# Patient Record
Sex: FEMALE | Race: WHITE
[De-identification: names, ages, dates, MRNs, and addresses within clinical notes are randomized per-mention and may not be internally consistent; named-entity substitution may affect disease eponyms.]

---

## 2018-02-13 ENCOUNTER — HOSPITAL ENCOUNTER (OUTPATIENT)
Dept: HOSPITAL 17 - PHSDC | Age: 68
Discharge: HOME | End: 2018-02-13
Payer: MEDICARE

## 2018-02-13 VITALS
RESPIRATION RATE: 16 BRPM | HEART RATE: 83 BPM | OXYGEN SATURATION: 97 % | DIASTOLIC BLOOD PRESSURE: 53 MMHG | SYSTOLIC BLOOD PRESSURE: 95 MMHG

## 2018-02-13 VITALS — WEIGHT: 143.3 LBS | HEIGHT: 61 IN | BODY MASS INDEX: 27.06 KG/M2

## 2018-02-13 VITALS — TEMPERATURE: 97.7 F

## 2018-02-13 DIAGNOSIS — H02.34: ICD-10-CM

## 2018-02-13 DIAGNOSIS — I10: ICD-10-CM

## 2018-02-13 DIAGNOSIS — H02.31: Primary | ICD-10-CM

## 2018-02-13 PROCEDURE — 00103 ANES RCNSTV PX EYELID: CPT

## 2018-02-13 PROCEDURE — 67904 REPAIR EYELID DEFECT: CPT

## 2018-02-13 NOTE — PD.OP
__________________________________________________





Operative Report


Bilateral Blepharochalasis and mild levator ptosis, Right more than left


Postoperative Diagnosis:  


Bilateral Blepharochalasis and mild levator ptosis, Right more than left


Procedure:


Bilateral Levator plication and blepharoplasties


Anesthesia:


IV sedation and local


Surgeon:


Natalio Johnson


Assistant(s):


RN


Resident Surgeon:


none


Operation and Findings:


Patient's both upper eyelids were marked preoperatively in holding area in 

sitting position, brought to the OR


IV sedation was started and Lidocaine 1% with epi 4 cc, 0.5 cc epi 1:1000, and 

1 cc Sodium bicarbonate injected total


Prep and drape done


Time out completed


Patient had a brief period of high blood pressure spike - it was allowed to 

settle down and the anesthesiologist also


gave her some beta blocker.


Surgery was started on right side, Marked skin ellipse and underlying 

orbicularis were excised down to the orbital septum


Lower part of the eyelid was placed on suture traction lightly


Orbital septum was opened from medial to lateral , exposing the medial and the 

middle fat pads, The lacrimal gland was avoided.


Orbital fat pads were anesthetized first and carefully removed at the surface 

level without pulling; with low power coag cautery. 


No active bleeding encountered.


Tarsal plate upper border was dissected clean, 5/0 Prolene suture was placed 

between the tarsal plate edge and levator aponeurosis


just below the middle fat pad and gradually tightened while releasing the lower 

traction suture. One additional suture was placed on 


either side of this suture. Patient was asked to open and close the eyelids to 

verify the improvement and range of the levator. 


Patient did not feel any corneal sensation from the sutures. Orbital septum was 

approximated with single 5/0 Vicryl and the 


lid skin was closed with 5/0 plain gut, keeping the medial end long, to be 

taped to the glabella at the end of surgery.


Similar procedure was completed on the left side as well. Both sides had 

excellent improvement and symmetry. Approx 2 mm elevation


was noted from the preoperative position.


Patient remained stable, minimal bleeding, no complications,











Natalio Johnson MD Feb 13, 2018 09:37

## 2018-04-03 ENCOUNTER — HOSPITAL ENCOUNTER (OUTPATIENT)
Dept: HOSPITAL 17 - NEPD | Age: 68
Setting detail: OBSERVATION
LOS: 1 days | Discharge: HOME | End: 2018-04-04
Attending: HOSPITALIST | Admitting: HOSPITALIST
Payer: MEDICARE

## 2018-04-03 VITALS
SYSTOLIC BLOOD PRESSURE: 112 MMHG | HEART RATE: 80 BPM | DIASTOLIC BLOOD PRESSURE: 57 MMHG | RESPIRATION RATE: 16 BRPM | OXYGEN SATURATION: 96 %

## 2018-04-03 VITALS
DIASTOLIC BLOOD PRESSURE: 57 MMHG | HEART RATE: 84 BPM | OXYGEN SATURATION: 97 % | TEMPERATURE: 98.3 F | RESPIRATION RATE: 17 BRPM | SYSTOLIC BLOOD PRESSURE: 114 MMHG

## 2018-04-03 VITALS
DIASTOLIC BLOOD PRESSURE: 68 MMHG | OXYGEN SATURATION: 96 % | TEMPERATURE: 98.2 F | HEART RATE: 74 BPM | RESPIRATION RATE: 18 BRPM | SYSTOLIC BLOOD PRESSURE: 114 MMHG

## 2018-04-03 VITALS — BODY MASS INDEX: 26.49 KG/M2 | HEIGHT: 61 IN | WEIGHT: 140.3 LBS

## 2018-04-03 DIAGNOSIS — M25.511: ICD-10-CM

## 2018-04-03 DIAGNOSIS — Z79.899: ICD-10-CM

## 2018-04-03 DIAGNOSIS — I73.00: ICD-10-CM

## 2018-04-03 DIAGNOSIS — J30.2: ICD-10-CM

## 2018-04-03 DIAGNOSIS — S52.021A: Primary | ICD-10-CM

## 2018-04-03 DIAGNOSIS — F41.9: ICD-10-CM

## 2018-04-03 DIAGNOSIS — E11.43: ICD-10-CM
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
## 2018-04-03 DIAGNOSIS — E05.90: ICD-10-CM

## 2018-04-03 DIAGNOSIS — E78.5: ICD-10-CM

## 2018-04-03 DIAGNOSIS — S80.01XA: ICD-10-CM

## 2018-04-03 DIAGNOSIS — F17.290: ICD-10-CM

## 2018-04-03 DIAGNOSIS — M25.521: ICD-10-CM
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
## 2018-04-03 DIAGNOSIS — Z79.84: ICD-10-CM

## 2018-04-03 DIAGNOSIS — E03.9: ICD-10-CM

## 2018-04-03 DIAGNOSIS — I10: ICD-10-CM

## 2018-04-03 DIAGNOSIS — G25.81: ICD-10-CM

## 2018-04-03 DIAGNOSIS — M79.7: ICD-10-CM

## 2018-04-03 DIAGNOSIS — K21.9: ICD-10-CM

## 2018-04-03 DIAGNOSIS — K58.9: ICD-10-CM

## 2018-04-03 DIAGNOSIS — F32.9: ICD-10-CM

## 2018-04-03 LAB
ALBUMIN SERPL-MCNC: 3.7 GM/DL (ref 3.4–5)
ALP SERPL-CCNC: 88 U/L (ref 45–117)
ALT SERPL-CCNC: 35 U/L (ref 10–53)
AST SERPL-CCNC: 70 U/L (ref 15–37)
BASOPHILS # BLD AUTO: 0.1 TH/MM3 (ref 0–0.2)
BASOPHILS NFR BLD: 0.6 % (ref 0–2)
BILIRUB SERPL-MCNC: 0.3 MG/DL (ref 0.2–1)
BUN SERPL-MCNC: 16 MG/DL (ref 7–18)
CALCIUM SERPL-MCNC: 9 MG/DL (ref 8.5–10.1)
CHLORIDE SERPL-SCNC: 106 MEQ/L (ref 98–107)
CREAT SERPL-MCNC: 0.83 MG/DL (ref 0.5–1)
EOSINOPHIL # BLD: 0.1 TH/MM3 (ref 0–0.4)
EOSINOPHIL NFR BLD: 0.5 % (ref 0–4)
ERYTHROCYTE [DISTWIDTH] IN BLOOD BY AUTOMATED COUNT: 13.4 % (ref 11.6–17.2)
GFR SERPLBLD BASED ON 1.73 SQ M-ARVRAT: 69 ML/MIN (ref 89–?)
GLUCOSE SERPL-MCNC: 109 MG/DL (ref 74–106)
HCO3 BLD-SCNC: 26.9 MEQ/L (ref 21–32)
HCT VFR BLD CALC: 38.1 % (ref 35–46)
HGB BLD-MCNC: 13.1 GM/DL (ref 11.6–15.3)
INR PPP: 1.2 RATIO
LYMPHOCYTES # BLD AUTO: 1.2 TH/MM3 (ref 1–4.8)
LYMPHOCYTES NFR BLD AUTO: 12.4 % (ref 9–44)
MCH RBC QN AUTO: 32.6 PG (ref 27–34)
MCHC RBC AUTO-ENTMCNC: 34.5 % (ref 32–36)
MCV RBC AUTO: 94.5 FL (ref 80–100)
MONOCYTE #: 0.5 TH/MM3 (ref 0–0.9)
MONOCYTES NFR BLD: 4.7 % (ref 0–8)
NEUTROPHILS # BLD AUTO: 8.2 TH/MM3 (ref 1.8–7.7)
NEUTROPHILS NFR BLD AUTO: 81.8 % (ref 16–70)
PLATELET # BLD: 236 TH/MM3 (ref 150–450)
PMV BLD AUTO: 9.5 FL (ref 7–11)
PROT SERPL-MCNC: 6.8 GM/DL (ref 6.4–8.2)
PROTHROMBIN TIME: 12.5 SEC (ref 9.8–11.6)
RBC # BLD AUTO: 4.03 MIL/MM3 (ref 4–5.3)
SODIUM SERPL-SCNC: 139 MEQ/L (ref 136–145)
WBC # BLD AUTO: 10 TH/MM3 (ref 4–11)

## 2018-04-03 PROCEDURE — 73060 X-RAY EXAM OF HUMERUS: CPT

## 2018-04-03 PROCEDURE — 85610 PROTHROMBIN TIME: CPT

## 2018-04-03 PROCEDURE — 71045 X-RAY EXAM CHEST 1 VIEW: CPT

## 2018-04-03 PROCEDURE — 73564 X-RAY EXAM KNEE 4 OR MORE: CPT

## 2018-04-03 PROCEDURE — 24685 OPTX ULNAR FX PROX END W/FIX: CPT

## 2018-04-03 PROCEDURE — 80048 BASIC METABOLIC PNL TOTAL CA: CPT

## 2018-04-03 PROCEDURE — 73080 X-RAY EXAM OF ELBOW: CPT

## 2018-04-03 PROCEDURE — 73030 X-RAY EXAM OF SHOULDER: CPT

## 2018-04-03 PROCEDURE — 93005 ELECTROCARDIOGRAM TRACING: CPT

## 2018-04-03 PROCEDURE — 96376 TX/PRO/DX INJ SAME DRUG ADON: CPT

## 2018-04-03 PROCEDURE — G0378 HOSPITAL OBSERVATION PER HR: HCPCS

## 2018-04-03 PROCEDURE — 96372 THER/PROPH/DIAG INJ SC/IM: CPT

## 2018-04-03 PROCEDURE — 80053 COMPREHEN METABOLIC PANEL: CPT

## 2018-04-03 PROCEDURE — 76000 FLUOROSCOPY <1 HR PHYS/QHP: CPT

## 2018-04-03 PROCEDURE — 85730 THROMBOPLASTIN TIME PARTIAL: CPT

## 2018-04-03 PROCEDURE — 96374 THER/PROPH/DIAG INJ IV PUSH: CPT

## 2018-04-03 PROCEDURE — 85025 COMPLETE CBC W/AUTO DIFF WBC: CPT

## 2018-04-03 PROCEDURE — 82948 REAGENT STRIP/BLOOD GLUCOSE: CPT

## 2018-04-03 PROCEDURE — 01740 ANES OPN/ARTHRS PX ELBW NOS: CPT

## 2018-04-03 PROCEDURE — 73070 X-RAY EXAM OF ELBOW: CPT

## 2018-04-03 PROCEDURE — C1713 ANCHOR/SCREW BN/BN,TIS/BN: HCPCS

## 2018-04-03 PROCEDURE — 99285 EMERGENCY DEPT VISIT HI MDM: CPT

## 2018-04-03 PROCEDURE — 94150 VITAL CAPACITY TEST: CPT

## 2018-04-03 RX ADMIN — Medication SCH ML: at 21:21

## 2018-04-03 RX ADMIN — INSULIN ASPART SCH: 100 INJECTION, SOLUTION INTRAVENOUS; SUBCUTANEOUS at 21:00

## 2018-04-03 RX ADMIN — MORPHINE SULFATE PRN MG: 2 INJECTION, SOLUTION INTRAMUSCULAR; INTRAVENOUS at 17:37

## 2018-04-03 RX ADMIN — HYDROCODONE BITARTRATE AND ACETAMINOPHEN PRN TAB: 10; 325 TABLET ORAL at 22:52

## 2018-04-03 RX ADMIN — MORPHINE SULFATE PRN MG: 2 INJECTION, SOLUTION INTRAMUSCULAR; INTRAVENOUS at 21:23

## 2018-04-03 RX ADMIN — INSULIN ASPART SCH: 100 INJECTION, SOLUTION INTRAVENOUS; SUBCUTANEOUS at 17:00

## 2018-04-03 NOTE — RADRPT
EXAM DATE/TIME:  04/03/2018 14:47 

 

[facility] COMPARISON:     

No previous studies available for comparison.

 

                     

INDICATIONS :     

Fall. Right elbow pain.

                     

 

MEDICAL HISTORY :     

None.          

 

SURGICAL HISTORY :     

None.   

 

ENCOUNTER:     

Initial                                        

 

ACUITY:     

1 day      

 

PAIN SCORE:     

10/10

 

LOCATION:     

Right  elbow, olecranon

 

FINDINGS:     

Comminuted fracture is seen of the olecranon. There are 2 main fracture fragments and the largest has
 approximately 3.5 cm of proximal displacement. Proximal radius and distal humerus are intact. No sub
luxation.

 

CONCLUSION:     

Comminuted and markedly displaced/ intra-articular fracture of the olecranon.

 

 

 

 Oscar Briggs MD on April 03, 2018 at 15:11           

Board Certified Radiologist.

 This report was verified electronically.

## 2018-04-03 NOTE — PD
HPI


Chief Complaint:  Fall


Time Seen by Provider:  15:04


Travel History


International Travel<30 days:  No


Contact w/Intl Traveler<30days:  No


Traveled to known affect area:  No





History of Present Illness


HPI


67-year-old female presents to the emergency department with complaint of right 

elbow pain, right shoulder pain, right knee pain after tripping over a curb in 

a parking lot and falling today.  Denies hitting her head or loss of 

consciousness.  Denies neck pain or back pain.  Denies chest pain, shortness of 

breath, abdominal pain.  Reports nausea without vomiting.  Denies paresthesias, 

loss of sensation to the affected extremities.  Rates pain 10/10 especially 

with movement.  Better at rest.  Throbbing and aching in sensation.  Has not 

taken any medication or tried any treatments to alleviate her symptoms.  Does 

have her right arm in a self-made sling.  History of Raynaud, Lyme disease, 

fibromyalgia, diabetes type 2 and takes metformin.  Allergies to codeine and 

Ancef.  Dr. Walton is primary care provider.  Has no other medical 

complaints.  No other modifying factors or associated signs and symptoms.








Patient tripped and fell in a parking lot


 She reports left elbow pain from fall


 No head injury or LOC


 No chest pain or abdominal pain





PFSH


Past Medical History


Blood Disorders:  No


Anxiety:  Yes


Cancer:  No


Cardiovascular Problems:  Yes (HYPERLIPIDEMIA)


Diabetes:  Yes (TYPE II)


Patient Takes Glucophage:  Yes


Diminished Hearing:  No


Endocrine:  Yes


Gastrointestinal Disorders:  Yes (GERD;GASTROPARESIS; IBS)


GERD:  Yes


Genitourinary:  Yes ((pt denies))


Hepatitis:  No


Hiatal Hernia:  No


Hypertension:  Yes


Immune Disorder:  Yes (FIBROMYALGIA; REYNAUDS)


Medical other:  Yes (DYSPHAGIA; HX OF LYME DISEASE)


Musculoskeletal:  Yes (OA)


Neurologic:  Yes (DIABETIC NEUROPATHY (pt denies))


Psychiatric:  Yes (DEPRESSION;ADD)


Reproductive:  No


Respiratory:  Yes (SINUSITIS;SEASONAL ALLERGIES)


Immunizations Current:  Yes (HEPATITIS)


Thyroid Disease:  Yes (HYPERTHYROID)


Tetanus Vaccination:  Unknown


Pregnant?:  Not Pregnant


Menopausal:  Yes





Past Surgical History


Abdominal Surgery:  No


AICD:  No


Body Medical Devices:  NONE


Cardiac Surgery:  No


Ear Surgery:  No


Endocrine Surgery:  No


Eye Surgery:  Yes (BILAT YAG)


Genitourinary Surgery:  No


Gynecologic Surgery:  Yes (HYSTERECTOMY; ANT.COLPORRHAPHY; REPAIR OF CYSTOCELE)


Hysterectomy:  Yes


Joint Replacement:  No


Neurologic Surgery:  No


Oral Surgery:  Yes (SINUS SX)


Pacemaker:  No


Thoracic Surgery:  No


Other Surgery:  Yes





Social History


Alcohol Use:  Yes (occ)


Tobacco Use:  No


Substance Use:  No





Allergies-Medications


(Allergen,Severity, Reaction):  


Coded Allergies:  


     cefazolin (Unverified  Allergy, Severe, Burning, 4/3/18)


 burning on face and head


     codeine (Unverified  Allergy, Severe, hives, 4/3/18)


 MILD REACTION


     penicillin G (Unverified  Allergy, Severe, ITCHING, 4/3/18)


 MILD REACTION


Reported Meds & Prescriptions





Reported Meds & Active Scripts


Active


Hydrocodone-Acetaminophen 5-325 mg Tab 1 Tab PO Q4H PRN 5 Days


Reported


Metformin HCl ER (Metformin HCl) 1,000 Mg [id number] 500  


Methylphenidate (Methylphenidate HCl) 10 Mg Chew 20  


Amphetamine-Dextroamphetamine 15 Mg Tab 15 Mg PO DAILY


     Avoid late evening doses. Space doses at least 4 to 6 hours if more


     than once/day dosing.


Fenofibrate 145 Mg Tab 145 Mg PO DAILY


Dulcolax DR (Bisacodyl) 5 Mg Tabdr 5 Mg PO DAILY PRN


Linzess (Linaclotide) 290 Mcg Cap 290 Mcg PO DAILY


Pantoprazole (Pantoprazole Sodium) 40 Mg Tab 40 Mg PO BID


Norvasc (Amlodipine Besylate) 5 Mg Tab 5 Mg PO DAILY


Ropinirole 1 Mg Tab 1 Mg PO BID


Synthroid (Levothyroxine Sodium) 50 Mcg Tab 50 Mcg PO DAILY








Review of Systems


Except as stated in HPI:  all other systems reviewed are Neg





Physical Exam


Narrative


GENERAL: Well-nourished, well-developed  female patient, in no acute 

distress


SKIN: Warm and dry.


HEAD: Atraumatic. Normocephalic. 


EYES: Pupils equal and round. No scleral icterus. No injection or drainage.


ENT: Mucosa pink and moist.  Airway patent.  


NECK: Trachea midline.  No midline tenderness on palpation of the cervical 

spine.  Active rotation of the neck greater than 45 to the left and right. 


CARDIOVASCULAR: Regular rate. 


RESPIRATORY: No accessory muscle use.


GASTROINTESTINAL: Flat.


MUSCULOSKELETAL:  Right elbow with edema; without erythema or ecchymosis; with 

tenderness on palpation; with decreased  strength; unable to assess range 

of motion secondary to patient guarding; sensory intact; 2+ radial pulse.  

Right Upper extremity supple and nontense with 2+ radial pulse and sensory 

intact.  Right knee with tenderness on palpation patient to the patellar and 

lateral aspect; with minimal edema; abrasion noted; without erythema, with 

ecchymosis; with flexion to 90 and joint stable with negative drawer test.  

Unable to assess right shoulder range of motion; tenderness on palpation to the 

right upper scapular area of the back.  right lower extremity is supple and 

nontender with 2+ pedal pulse and sensory intact.  No obvious deformities. No 

clubbing.  No cyanosis.  No edema. 


BACK: No midline tenderness on palpation of the cervical, thoracic, lumbar 

spine.  Patient standing at bedside in the room. 


NEUROLOGICAL: Awake and alert.  Oriented 3.  No obvious cranial nerve 

deficits.  Motor grossly within normal limits. Normal speech. 


PSYCHIATRIC: Appropriate mood and affect; insight and judgment normal.





Data


Data


Last Documented VS





Vital Signs








  Date Time  Temp Pulse Resp B/P (MAP) Pulse Ox O2 Delivery O2 Flow Rate FiO2


 


4/3/18 14:23 98.2 74 18 114/68 (83) 96   








Orders





 Orders


Humerus (Min 2vws) (4/3/18 )


Elbow, Limited (Ap&Lat) (4/3/18 )


Shoulder, Limited(2vws) (4/3/18 )


Knee, Complete (4vws) (4/3/18 )


Oxycodone-Acetamin 5-325 Mg (Percocet (4/3/18 15:30)


Ondansetron  Odt (Zofran  Odt) (4/3/18 15:30)


Splint Or Brace Apply/Monitor (4/3/18 15:29)


Sling Cradle Arm (4/3/18 )


Ice/Cold Pack (4/3/18 15:29)


Complete Blood Count With Diff (4/3/18 15:41)


Comprehensive Metabolic Panel (4/3/18 15:41)


Prothrombin Time / Inr (Pt) (4/3/18 15:41)


Act Partial Throm Time (Ptt) (4/3/18 15:41)


Iv Access Insert/Monitor (4/3/18 15:41)


Sodium Chloride 0.9% Flush (Ns Flush) (4/3/18 15:45)


Electrocardiogram (4/3/18 15:41)


Chest, Single Ap (4/3/18 15:41)


Npo After Midnight W/ Po Meds (4/3/18 Dinner)


Consult Orthopedic (4/3/18 )


Admit Order (Ed Use Only) (4/3/18 15:52)








MDM


Medical Decision Making


Medical Screen Exam Complete:  Yes


Emergency Medical Condition:  Yes


Medical Record Reviewed:  Yes


Differential Diagnosis


Fall, fracture, sprain, contusion, abrasion


Narrative Course


67-year-old female with right elbow pain, right shoulder pain, right knee pain 

after mechanical fall today.  Denies hitting her head or loss of consciousness.

  Denies neck pain or back pain.  Right knee x-ray, right elbow x-ray, right 

humerus x-ray, right shoulder x-ray ordered in triage. 


1525: Right elbow x-ray concludes comminuted and markedly displaced/ 

intra-articular fracture of the olecranon.  Right humerus x-ray, right knee x-

ray, right shoulder x-ray are all unremarkable.  Findings discussed with the 

patient.


1527: Call placed to on-call orthopedic surgeon.


1540:  I spoke woptDr. Juan Wolf's, PA.  Splint ordered.  Preop orders 

ordered.


1547: I spoke with Dr. Dyer, [facility] and report was given for patient 

admission.





Physician Communication


Physician Communication


Dr. Dyer, [facility]


JULIANA OchoaC (Dr. Martinez)





Diagnosis





 Primary Impression:  


 Fall


 Qualified Codes:  W19.XXXA - Unspecified fall, initial encounter


 Additional Impressions:  


 Elbow fracture, right


 Qualified Codes:  S42.401A - Unspecified fracture of lower end of right humerus

, initial encounter for closed fracture


 Contusion of right knee


 Qualified Codes:  S80.01XA - Contusion of right knee, initial encounter


 Right shoulder injury


 Qualified Codes:  S49.91XA - Unspecified injury of right shoulder and upper arm

, initial encounter





Admitting Information


Admitting Physician Requests:  Observation











Edna Gonzalez Apr 3, 2018 15:05

## 2018-04-03 NOTE — RADRPT
EXAM DATE/TIME:  04/03/2018 14:55 

 

[facility] COMPARISON:     

No previous studies available for comparison.

 

                     

INDICATIONS :     

Fall. Right elbow pain radiating proximally.

                     

 

MEDICAL HISTORY :     

None.          

 

SURGICAL HISTORY :     

None.   

 

ENCOUNTER:     

Initial                                        

 

ACUITY:     

1 day      

 

PAIN SCORE:     

10/10

 

LOCATION:     

Right  elbow, olecranon

 

FINDINGS:     

Two view examination of the right shoulder demonstrates no evidence of fracture or dislocation.  The 
glenohumeral and acromioclavicular joints are maintained.  Bony mineralization is normal.

 

CONCLUSION:     Intact right shoulder.

 

 

 

 Oscar Briggs MD on April 03, 2018 at 15:12           

Board Certified Radiologist.

 This report was verified electronically.

## 2018-04-03 NOTE — RADRPT
EXAM DATE/TIME:  04/03/2018 16:33 

 

[facility] COMPARISON:     

No previous studies available for comparison.

 

                     

INDICATIONS :     

Evaluate for pneumonia, pneumothorax, and or communicable disease.

                     

 

MEDICAL HISTORY :     

None.          

 

SURGICAL HISTORY :     

None.   

 

ENCOUNTER:     

Initial                                        

 

ACUITY:     

1 day      

 

PAIN SCORE:     

0/10

 

LOCATION:     

Bilateral  chest

 

FINDINGS:     

A single view of the chest demonstrates the lungs to be symmetrically aerated without evidence of mas
s, infiltrate or effusion.  The cardiomediastinal contours are unremarkable.  Osseous structures are 
intact.

 

CONCLUSION:     

No evidence of acute cardiopulmonary disease.

 

 

 

 Oscar Briggs MD on April 03, 2018 at 16:57           

Board Certified Radiologist.

 This report was verified electronically.

## 2018-04-03 NOTE — RADRPT
EXAM DATE/TIME:  04/03/2018 14:44 

 

[facility] COMPARISON:     

No previous studies available for comparison.

 

                     

INDICATIONS :     

Fall. Right elbow pain.

                     

 

MEDICAL HISTORY :     

None.          

 

SURGICAL HISTORY :     

None.   

 

ENCOUNTER:     

Initial                                        

 

ACUITY:     

1 day      

 

PAIN SCORE:     

10/10

 

LOCATION:     

Right  elbow, olecranon

 

FINDINGS:     

Two view examination of the right humerus demonstrates no evidence of fracture or dislocation.  Bony 
mineralization is normal.  The soft tissue structures are intact.

 

CONCLUSION:     Intact right humerus.

 

 

 

 Oscar Briggs MD on April 03, 2018 at 15:10           

Board Certified Radiologist.

 This report was verified electronically.

## 2018-04-03 NOTE — HHI.HP
HPI


Service


[facility]


Primary Care Physician


Jay Walton MD


Admission Diagnosis


Fall, right olecranon fracture


Chief Complaint:  


shoulder and knee pain after fall


Travel History


International Travel<30 Days:  No


Contact w/Intl Traveler <30 Da:  No


Traveled to Known Affected Are:  No


History of Present Illness


This is a 67 year old female patient with past medical history which includes 

fibromyalgia, Lyme disease status post treatment, Juan's syndrome, attention 

deficit disorder, constipation, irritable bowel syndrome, GERD, restless leg 

syndrome, diabetes mellitus type 2.  Patient was in her normal state of health 

today walking through the parking lot to go shopping tripped over a curb stop 

falling on her right side.  After the fall patient experienced pain in her 

right shoulder, arm and knee.  Patient presented to the emergency department 

for further evaluation and treatment.  Patient rates her pain 10 out of 10 

initially reports it is improved after PO Percocet.  Patient describes the pain 

as an aching throbbing sensation.  Patient denies any loss of sensation, 

paresthesias, chest pain, palpitations, SOB, fevers or chills.





X-ray right elbow reveals communicated and markedly displaced/ intra-

articular fracture of the olecranon





Review of Systems


Constitutional:  DENIES: Fatigue, Fever, Chills


Respiratory:  DENIES: Cough, Sputum production, Shortness of breath


Cardiovascular:  DENIES: Chest pain, Palpitations, Dyspnea on Exertion


Gastrointestinal:  DENIES: Abdominal pain, Nausea, Vomiting


Neurologic:  DENIES: Headache, Localized weakness, Speech Problems


Psychiatric:  DENIES: Anxiety, Confusion, Depression





Past Family Social History


Past Medical History


fibromyalgia, Lyme disease status post treatment, Juan's syndrome, attention 

deficit disorder, constipation, irritable bowel syndrome, GERD, restless leg 

syndrome, diabetes mellitus type 2


Past Surgical History


Cystocele repair


A-P Colporrhaphy pelvic repair


Hysterectomy


Colonoscopy


Sinus surgery


Reported Medications


Hydrocodone-Acetaminophen 5-325 mg Tab 1 Tab PO Q4H PRN 5 Days


Metformin (Metformin HCl) 500 Mg Tab 500 Mg PO BIDPC


Amphetamine-Dextroamphetamine 15 Mg Tab 15 Mg PO DAILY


     Avoid late evening doses. Space doses at least 4 to 6 hours if more


     than once/day dosing.


Fenofibrate 145 Mg Tab 145 Mg PO DAILY


Dulcolax DR (Bisacodyl) 5 Mg Tabdr 5 Mg PO DAILY PRN


Linzess (Linaclotide) 290 Mcg Cap 290 Mcg PO DAILY


Pantoprazole (Pantoprazole Sodium) 40 Mg Tab 40 Mg PO DAILY


Norvasc (Amlodipine Besylate) 5 Mg Tab 5 Mg PO DAILY


Ropinirole 1 Mg Tab 1 Mg PO HS


Synthroid (Levothyroxine Sodium) 50 Mcg Tab 50 Mcg PO DAILY


Allergies:  


Coded Allergies:  


     cefazolin (Unverified  Allergy, Severe, Burning, 4/3/18)


 burning on face and head


     codeine (Unverified  Allergy, Severe, hives, 4/3/18)


 MILD REACTION


     penicillin G (Unverified  Allergy, Severe, ITCHING, 4/3/18)


 MILD REACTION


Family History


reviewed and noncontributory


Social History


Tobacco use: occasional cigar socially


ETOH use: socially not on a daily basis


Denies illicit drug use.  Does use medical marijuana





Physical Exam


Vital Signs





Vital Signs








  Date Time  Temp Pulse Resp B/P (MAP) Pulse Ox O2 Delivery O2 Flow Rate FiO2


 


4/3/18 14:23 98.2 74 18 114/68 (83) 96   








Physical Exam


GENERAL: This is a well-nourished, well-developed patient, in no apparent 

distress.


SKIN: sling right upper arm, abrasion right knee


HEAD: Atraumatic. Normocephalic. No temporal or scalp tenderness.


EYES: Extraocular motions intact. No scleral icterus. No injection or drainage. 


CARDIOVASCULAR: Regular rate and rhythm 


RESPIRATORY: Clear to auscultation. Breath sounds equal bilaterally.


GASTROINTESTINAL: Abdomen soft, non-tender, nondistended. No hepato-splenomegaly

, or palpable masses. No guarding.


MUSCULOSKELETAL: Extremities without clubbing, cyanosis, or edema. No joint 

tenderness, effusion, or edema noted. No calf tenderness. Negative Homans sign 

bilaterally.


NEUROLOGICAL: Awake and alert. No focal deficits.  Motor and sensory grossly 

within normal limits. Five out of 5 muscle strength in all muscle groups, with 

the exception of right upper extremity in sling.  Normal speech.


Imaging





Last Impressions








Shoulder X-Ray 4/3/18 0000 Signed





Impressions: 





 Service Date/Time:  Tuesday, April 3, 2018 14:55 - CONCLUSION: Intact right 





 shoulder.     Oscar Briggs MD 


 


Knee X-Ray 4/3/18 0000 Signed





Impressions: 





 Service Date/Time:  Tuesday, April 3, 2018 15:02 - CONCLUSION: Intact right 





 knee.     Oscar Briggs MD 


 


Humerus X-Ray 4/3/18 0000 Signed





Impressions: 





 Service Date/Time:  Tuesday, April 3, 2018 14:44 - CONCLUSION: Intact right 





 humerus.     Oscar Briggs MD 


 


Elbow X-Ray 4/3/18 0000 Signed





Impressions: 





 Service Date/Time:  Tuesday, April 3, 2018 14:47 - CONCLUSION:  Comminuted and 





 markedly displaced/ intra-articular fracture of the olecranon.     

Oscar Briggs MD 











Capmo VTE Risk Assessment


Caprini VTE Risk Assessment:  Mod/High Risk (score >= 2)


Caprini Risk Assessment Model











 Point Value = 1          Point Value = 2  Point Value = 3  Point Value = 5


 


Age 41-60


Minor surgery


BMI > 25 kg/m2


Swollen legs


Varicose veins


Pregnancy or postpartum


History of unexplained or recurrent


   spontaneous 


Oral contraceptives or hormone


   replacement


Sepsis (< 1 month)


Serious lung disease, including


   pneumonia (< 1 month)


Abnormal pulmonary function


Acute myocardial infarction


Congestive heart failure (< 1 month)


History of inflammatory bowel disease


Medical patient at bed rest Age 61-74


Arthroscopic surgery


Major open surgery (> 45 min)


Laparoscopic surgery (> 45 min)


Malignancy


Confined to bed (> 72 hours)


Immobilizing plaster cast


Central venous access Age >= 75


History of VTE


Family history of VTE


Factor V Leiden


Prothrombin [id number]


Lupus anticoagulant


Anticardiolipin antibodies


Elevated serum homocysteine


Heparin-induced thrombocytopenia


Other congenital or acquired


   thrombophilia Stroke (< 1 month)


Elective arthroplasty


Hip, pelvis, or leg fracture


Acute spinal cord injury (< 1 month)








Prophylaxis Regimen











   Total Risk


Factor Score Risk Level Prophylaxis Regimen


 


0-1      Low Early ambulation


 


2 Moderate Order ONE of the following:


*Sequential Compression Device (SCD)


*Heparin 5000 units SQ BID


 


3-4 Higher Order ONE of the following medications:


*Heparin 5000 units SQ TID


*Enoxaparin/Lovenox 40 mg SQ daily (WT < 150 kg, CrCl > 30 mL/min)


*Enoxaparin/Lovenox 30 mg SQ daily (WT < 150 kg, CrCl > 10-29 mL/min)


*Enoxaparin/Lovenox 30 mg SQ BID (WT < 150 kg, CrCl > 30 mL/min)


AND/OR


*Sequential Compression Device (SCD)


 


5 or more Highest Order ONE of the following medications:


*Heparin 5000 units SQ TID (Preferred with Epidurals)


*Enoxaparin/Lovenox 40 mg SQ daily (WT < 150 kg, CrCl > 30 mL/min)


*Enoxaparin/Lovenox 30 mg SQ daily (WT < 150 kg, CrCl > 10-29 mL/min)


*Enoxaparin/Lovenox 30 mg SQ BID (WT < 150 kg, CrCl > 30 mL/min)


AND


*Sequential Compression Device (SCD)











Assessment and Plan


Problem List:  


(1) Olecranon fracture


ICD Codes:  S52.023A - Displaced fracture of olecranon process without 

intraarticular extension of unspecified ulna, initial encounter for closed 

fracture


Plan:  Right olecranon process fracture


Fall


X-ray right elbow reveals communicated and markedly displaced/ intra-

articular fracture of the olecranon


currently in sling 


consult to orthopedic surgery plan surgical intervention in AM


NPO after midnight


Norco and Morphine IV as needed for pain


Right Humerus X Ray: intact right humerus


Right Shoulder X ray: Intact right shoulder


Right knee X ray: Intact right knee





Diabetes mellitus


continue home metformin


Diabetic diet 


NPO after midnight


Acc checks ACHS with SSI coverage





Raynaud syndrome


continue home Amlodipine 5mg PO daily





GERD


continue home Protonix 40 mg PO daily





IBS


continue home Linzess





Hypothyroidism


Continue home Levothyroxine 50 mcg PO daily





Restless leg syndrome


Continue home Ropinirole





DVT prophylaxis with SCDs





(2) Raynauds syndrome


ICD Codes:  I73.00 - Raynaud's syndrome without gangrene


(3) GERD (gastroesophageal reflux disease)


ICD Codes:  K21.9 - Gastro-esophageal reflux disease without esophagitis


(4) IBS (irritable bowel syndrome)


ICD Codes:  K58.9 - Irritable bowel syndrome without diarrhea


(5) Diabetes mellitus


ICD Codes:  E11.9 - Type 2 diabetes mellitus without complications


(6) Hypothyroidism


ICD Codes:  E03.9 - Hypothyroidism, unspecified











Soumya Morrison Apr 3, 2018 16:03

## 2018-04-03 NOTE — RADRPT
EXAM DATE/TIME:  04/03/2018 15:02 

 

[facility] COMPARISON:     

No previous studies available for comparison.

 

                     

INDICATIONS :     

Fell today at the store.

                     

 

MEDICAL HISTORY :     

None.          

 

SURGICAL HISTORY :     

None.   

 

ENCOUNTER:     

Initial                                        

 

ACUITY:     

1 day      

 

PAIN SCORE:     

5/10

 

LOCATION:     

Right  knee

 

FINDINGS:     

Four view examination of the right knee demonstrates no evidence of fracture or dislocation.  Bony mi
neralization is normal.  The articular surfaces are intact.  The suprapatellar soft tissues have a no
rmal configuration.

 

CONCLUSION:     Intact right knee.

 

 

 

 Oscar Briggs MD on April 03, 2018 at 15:13           

Board Certified Radiologist.

 This report was verified electronically.

## 2018-04-04 VITALS
SYSTOLIC BLOOD PRESSURE: 118 MMHG | DIASTOLIC BLOOD PRESSURE: 56 MMHG | TEMPERATURE: 97.6 F | HEART RATE: 74 BPM | RESPIRATION RATE: 18 BRPM | OXYGEN SATURATION: 96 %

## 2018-04-04 VITALS
OXYGEN SATURATION: 95 % | TEMPERATURE: 97.7 F | DIASTOLIC BLOOD PRESSURE: 53 MMHG | SYSTOLIC BLOOD PRESSURE: 101 MMHG | RESPIRATION RATE: 16 BRPM | HEART RATE: 71 BPM

## 2018-04-04 VITALS
HEART RATE: 65 BPM | OXYGEN SATURATION: 98 % | RESPIRATION RATE: 17 BRPM | DIASTOLIC BLOOD PRESSURE: 59 MMHG | TEMPERATURE: 97.6 F | SYSTOLIC BLOOD PRESSURE: 108 MMHG

## 2018-04-04 VITALS
OXYGEN SATURATION: 94 % | TEMPERATURE: 97.5 F | DIASTOLIC BLOOD PRESSURE: 58 MMHG | HEART RATE: 82 BPM | RESPIRATION RATE: 18 BRPM | SYSTOLIC BLOOD PRESSURE: 123 MMHG

## 2018-04-04 VITALS — OXYGEN SATURATION: 96 %

## 2018-04-04 LAB
BASOPHILS # BLD AUTO: 0 TH/MM3 (ref 0–0.2)
BASOPHILS NFR BLD: 0.4 % (ref 0–2)
BUN SERPL-MCNC: 13 MG/DL (ref 7–18)
CALCIUM SERPL-MCNC: 8.7 MG/DL (ref 8.5–10.1)
CHLORIDE SERPL-SCNC: 105 MEQ/L (ref 98–107)
CREAT SERPL-MCNC: 0.73 MG/DL (ref 0.5–1)
EOSINOPHIL # BLD: 0.1 TH/MM3 (ref 0–0.4)
EOSINOPHIL NFR BLD: 1.2 % (ref 0–4)
ERYTHROCYTE [DISTWIDTH] IN BLOOD BY AUTOMATED COUNT: 13.5 % (ref 11.6–17.2)
GFR SERPLBLD BASED ON 1.73 SQ M-ARVRAT: 80 ML/MIN (ref 89–?)
GLUCOSE SERPL-MCNC: 115 MG/DL (ref 74–106)
HCO3 BLD-SCNC: 28.6 MEQ/L (ref 21–32)
HCT VFR BLD CALC: 37.1 % (ref 35–46)
HGB BLD-MCNC: 12.9 GM/DL (ref 11.6–15.3)
LYMPHOCYTES # BLD AUTO: 2.8 TH/MM3 (ref 1–4.8)
LYMPHOCYTES NFR BLD AUTO: 28.9 % (ref 9–44)
MCH RBC QN AUTO: 33.3 PG (ref 27–34)
MCHC RBC AUTO-ENTMCNC: 34.9 % (ref 32–36)
MCV RBC AUTO: 95.4 FL (ref 80–100)
MONOCYTE #: 0.6 TH/MM3 (ref 0–0.9)
MONOCYTES NFR BLD: 6.5 % (ref 0–8)
NEUTROPHILS # BLD AUTO: 6.1 TH/MM3 (ref 1.8–7.7)
NEUTROPHILS NFR BLD AUTO: 63 % (ref 16–70)
PLATELET # BLD: 204 TH/MM3 (ref 150–450)
PMV BLD AUTO: 9.4 FL (ref 7–11)
RBC # BLD AUTO: 3.89 MIL/MM3 (ref 4–5.3)
SODIUM SERPL-SCNC: 141 MEQ/L (ref 136–145)
WBC # BLD AUTO: 9.7 TH/MM3 (ref 4–11)

## 2018-04-04 RX ADMIN — Medication SCH ML: at 09:00

## 2018-04-04 RX ADMIN — INSULIN ASPART SCH: 100 INJECTION, SOLUTION INTRAVENOUS; SUBCUTANEOUS at 08:00

## 2018-04-04 RX ADMIN — MORPHINE SULFATE PRN MG: 2 INJECTION, SOLUTION INTRAMUSCULAR; INTRAVENOUS at 04:22

## 2018-04-04 RX ADMIN — HYDROCODONE BITARTRATE AND ACETAMINOPHEN PRN TAB: 10; 325 TABLET ORAL at 10:34

## 2018-04-04 RX ADMIN — INSULIN ASPART SCH: 100 INJECTION, SOLUTION INTRAVENOUS; SUBCUTANEOUS at 17:00

## 2018-04-04 RX ADMIN — MORPHINE SULFATE PRN MG: 2 INJECTION, SOLUTION INTRAMUSCULAR; INTRAVENOUS at 15:15

## 2018-04-04 RX ADMIN — INSULIN ASPART SCH: 100 INJECTION, SOLUTION INTRAVENOUS; SUBCUTANEOUS at 12:00

## 2018-04-04 RX ADMIN — HYDROCODONE BITARTRATE AND ACETAMINOPHEN PRN TAB: 10; 325 TABLET ORAL at 19:01

## 2018-04-04 NOTE — HHI.FF
Face to Face Verification


Diagnosis:  


(1) Elbow fracture, right


(2) Contusion of right knee


(3) GERD (gastroesophageal reflux disease)


(4) IBS (irritable bowel syndrome)


Physical Therapy


Order:  Evaluate and Treat





Occupational Therapy


Order:  Evaluate and Treat (when cleared by Ortho to start OT)





Home Health Nursing








Order: Nursing assessment with vital signs











Home Health Aide


Order: To Assist In:  Bathing and personal care











I have seen patient Daphney Merrill on 4/4/18. My clinical findings 

support the need for the requested home health care services because:








 Limited ability to care for self














I certify that my clinical findings support that this patient is homebound 

because:








 Post-op weakness

















Farrah Polanco Apr 4, 2018 13:03

## 2018-04-04 NOTE — PD.OP
cc:   Vince Middleton MD


__________________________________________________





Operative Report


Date of Surgery:  Apr 4, 2018


Preoperative Diagnosis:  


Comminuted right olecranon fracture


Postoperative Diagnosis:  


Procedure:


Open reduction internal fixation right olecranon


Surgeon:


Vince Middleton


Assistant(s):


LANIE Naylor PA-C


 


The surgical procedure was assisted by my physician assistant. My P.A. presence 

was necessary throughout this case for the manipulation and positioning of the 

surgical extremity. My P.A. was assisting me throughout the duration of this 

procedure. The skill set of a physician assistant was medically necessary to 

complete this procedure. During the surgical case the surgical tech was working 

at the back table and the physician assistant was directly assisting me.


Operation and Findings:


Implants used: Synthes





Patient was seen and evaluated preoperatively.  Patient was found to have a 

displaced comminuted intra-articular right olecranon fracture.  The risk and 

benefits of the surgery were discussed in depth and informed consent was 

obtained.  Risk of surgery include bleeding, infection, painful hardware, wound

, case, elbow stiffness, loss of motion, elbow arthritis, injuries to arteries 

nerves or blood vessels, weakness and numbness of hand, as well as medical 

complications associated with general anesthesia.  All questions were answered.

  Patient was brought to operating room.  IV sedation and anesthesia were 

administered.  Patient was placed into a lateral decubitus position.  Timeout 

procedure was performed.  IV antibiotics were administered prior to incision.  

The operative arm was prepped with alcohol followed by Hibiclens and draped in 

usual sterile fashion.





Procedure began with a 5 inch incision over the olecranon.  Subcutaneous tissue 

dissected with Bovie.  Fracture site was visualized.  Fascia was elevated 

around the fracture site.  There was mild comminution of the fracture site.  

Fracture fragments were gently manipulated.  There was depression of the 

articular surface.  The articular surface was elevated with a freer elevator.  

Cancellus bone chips were packed underneath the articular surface for distal 

support.  A fracture tenaculum was used to aid in reduction.  Multiple K wires 

result provisional fixation.  A Synthes proximal plate was selected.  Plate was 

provisionally held with K wires 3.5 cortical screws were used to compress plate 

to bone.  Multiple cortical screws were placed in the ulna shaft.  Multiple 

locking screws were placed in the proximal ulna.  All screws were predrilled 

and premeasured for appropriate length.  K wires were removed.  Final 

fluoroscopy revealed excellent of fractures well-placed hardware.  Articular 

surface appeared to be in near anatomic alignment.  Wound was now thoroughly 

irrigated.  Additional #2 FiberWire suture was placed through the triceps 

tendon and sutured to the plate for additional stability.  Incision was now 

closed with #1 Vicryl, 3-0 Vicryl, and staples.  Sterile dressings were 

applied.  Patient's placed a well molded well-padded splint.  Patient was 

transferred to recovery in stable condition.











Vince Middleton MD Apr 4, 2018 09:15

## 2018-04-04 NOTE — MB
cc:

Vince Martinez MD

****

 

 

DATE:

04/04/2018

 

REASON FOR CONSULTATION:

Right olecranon fracture

 

CONSULTING PHYSICIAN:

Dr. Dyer.

 

HISTORY OF PRESENT ILLNESS:

The patient is a 67-year-old female who had a fall.  She was going 

shopping, when she tripped over a curb.  She landed on her right side.

 She had a small abrasion on her right knee.  She landed mostly on her

right elbow.  She had immediate right elbow pain.  She presented to 

the Emergency Room where x-rays revealed a displaced right olecranon 

fracture.  She is currently awake and alert, on the orthopedic floor. 

Her main complaint is her right elbow.  Pain is worse with movement 

and is improved with rest.

 

PAST MEDICAL HISTORY:

Fibromyalgia, history of Lyme disease, ADHD, irritable bowel syndrome,

reflux, and type 2 diabetes.

 

PAST SURGICAL HISTORY:

Cystocele repair, hysterectomy, colonoscopy, and sinus surgery.

 

MEDICATIONS:

1.  Hydrocodone

2.  Metformin.

3.  Nifedipine.

4.  Dulcolax.

5.  Pantoprazole.

6.  Norvasc.

7.  Synthroid.

 

ALLERGIES:

CEFAZOLIN, CODEINE, AND PENICILLIN.

 

SOCIAL HISTORY:

The patient drinks alcohol socially.  She uses medical marijuana.  She

smokes occasional cigars.

 

FAMILY HISTORY:

Noncontributory.  She denies familial medical problems.

 

REVIEW OF SYSTEMS:

The patient denies headache, visual changes, neck pain, chest pain, 

shortness of breath, abdominal pain, nausea, vomiting, recent weight 

loss, fevers or chills, numbness or tingling in the extremities.  She 

complains of right elbow pain.  The pain is worse with movement.

 

LABORATORY DATA:

The patient has a white blood cell count of 10.0, hematocrit of 38.1, 

and platelet count of 236.  INR is 1.2.  BUN is 16 and creatinine 

0.83.

 

X-RAY STUDIES:

X-rays of right elbow were reviewed.  X-rays reveal a comminuted right

olecranon fracture.  There is significant displacement.

 

PHYSICAL EXAMINATION:

GENERAL:  The patient is a pleasant 67-year-old female.  She is awake.

 She is alert and oriented x 3.  She appears well-developed, 

well-nourished.

VITAL SIGNS:  Temperature 97.6, pulse 65, respirations 17, blood 

pressure 108/59, O2 saturations 98% on room air.

HEENT:  Head:  The patient is normocephalic.  Pupils are equal.

NECK:  Soft, nontender.  The trachea is in the midline.

ABDOMEN:  Soft, nontender, and nondistended.

EXTREMITIES:  Examination of her right arm reveals no tenderness in 

her shoulder, wrist or fingers.  She has intact sensation in all 

fingers.  She has good capillary refill in all fingers.  Radial pulse 

is palpable.  She has tenderness to palpation over the elbow.  Forearm

compartments are soft.  Examination of her left arm reveals no pain 

with shoulder, elbow and wrist motion.  She has intact sensation in 

all fingers.  She has good capillary refill at fingers.  Skin is 

intact.  Radial pulse is palpable.  Examination of her bilateral lower

extremities reveals no significant pain with hip, knee or ankle 

motion.  Sensation is intact to both feet.  She has good capillary 

refill in both feet.  She does have a superficial abrasion of her 

right anterior knee.

 

IMPRESSION:

1.  Diabetes.

2.  Displaced right olecranon fracture.

3.  Fibromyalgia.

 

PLAN:

The treatment options were discussed with the patient.  At this point,

I would recommend open reduction and internal fixation of right 

olecranon with possible allograft bone grafting.  Risks of surgery 

include bleeding, infection, injuries to arteries, nerves or blood 

vessels, nonunion, malunion, painful hardware, loss of elbow motion, 

wound complications, need for hardware removal, as well as medical 

complications associated with anesthesia.  All questions were 

answered.  I will plan on surgery today.

 

A mid-level provider in my office, nurse practitioner or PA, may see 

this patient on a follow-up basis and continue to implement the 

objective of this plan including:  Starting or adjusting medications, 

injections of muscle, tendon, bursa or joints, cast application, 

orthotic or brace application, physical therapy, further radiographic 

studies including x-ray, MRI, CT, ultrasounds or bone scan, vascular 

studies, neurologic studies, or other specialist consultations, and 

proceeding with surgical management as appropriate.

 

 

__________________________________

MD JUAN CARLOS Peterson/REAL

D: 04/04/2018, 07:23 AM

T: 04/04/2018, 07:46 AM

Visit #: [id number]

Job #: [id number]

## 2018-04-04 NOTE — HHI.PR
Subjective


Remarks


TO OR FOR ORIF RIGHT OLECRANON





Objective


Vitals


heart reg


lung cta


abd s/nt


ext no edema


Vital Signs








  Date Time  Temp Pulse Resp B/P (MAP) Pulse Ox O2 Delivery O2 Flow Rate FiO2


 


4/4/18 05:00 97.6 65 17 108/59 (75) 98   


 


4/4/18 00:10 97.7 71 16 101/53 (69) 95   


 


4/3/18 20:05 98.3 84 17 114/57 (76) 97   


 


4/3/18 17:14        


 


4/3/18 17:04  80 16 112/57 (75) 96 Room Air  


 


4/3/18 14:23 98.2 74 18 114/68 (83) 96   














 4/4/18 4/4/18 4/5/18





 15:00 23:00 07:00


 


Intake Total 750 ml  


 


Output Total 25 ml  


 


Balance 725 ml  


 


   


 


Other 750 ml  


 


Output Estimated Blood Loss 25 ml  








Result Diagram:  


4/4/18 0612                                                                    

            4/4/18 0612





Imaging





Last Impressions








Shoulder X-Ray 4/3/18 0000 Signed





Impressions: 





 Service Date/Time:  Tuesday, April 3, 2018 14:55 - CONCLUSION: Intact right 





 shoulder.     Oscar Briggs MD 


 


Knee X-Ray 4/3/18 0000 Signed





Impressions: 





 Service Date/Time:  Tuesday, April 3, 2018 15:02 - CONCLUSION: Intact right 





 knee.     Osacr Briggs MD 


 


Humerus X-Ray 4/3/18 0000 Signed





Impressions: 





 Service Date/Time:  Tuesday, April 3, 2018 14:44 - CONCLUSION: Intact right 





 humerus.     Oscar Briggs MD 


 


Elbow X-Ray 4/3/18 0000 Signed





Impressions: 





 Service Date/Time:  Tuesday, April 3, 2018 14:47 - CONCLUSION:  Comminuted and 





 markedly displaced/ intra-articular fracture of the olecranon.     

Oscar Briggs MD 











A/P


Problem List:  


(1) Olecranon fracture


ICD Codes:  S52.023A - Displaced fracture of olecranon process without 

intraarticular extension of unspecified ulna, initial encounter for closed 

fracture


Status:  Acute


Plan:  Right olecranon process fracture


Fall


X-ray right elbow reveals communicated and markedly displaced/ intra-

articular fracture of the olecranon


currently in sling 


consulted ortho who took pt to OR today for ORIF of olecranon


prn pain meds


advance diet


dc home when ok with ortho.





Diabetes mellitus


continue home metformin


Diabetic diet 


NPO after midnight


Acc checks ACHS with SSI coverage





Raynaud syndrome


continue home Amlodipine 5mg PO daily





GERD


continue home Protonix 40 mg PO daily





IBS


continue home Linzess





Hypothyroidism


Continue home Levothyroxine 50 mcg PO daily





Restless leg syndrome


Continue home Ropinirole





DVT prophylaxis with SCDs





(2) Raynauds syndrome


ICD Codes:  I73.00 - Raynaud's syndrome without gangrene


Status:  Chronic


(3) GERD (gastroesophageal reflux disease)


ICD Codes:  K21.9 - Gastro-esophageal reflux disease without esophagitis


Status:  Chronic


(4) IBS (irritable bowel syndrome)


ICD Codes:  K58.9 - Irritable bowel syndrome without diarrhea


Status:  Chronic


(5) Diabetes mellitus


ICD Codes:  E11.9 - Type 2 diabetes mellitus without complications


Status:  Chronic


(6) Hypothyroidism


ICD Codes:  E03.9 - Hypothyroidism, unspecified


Status:  Chronic











Felix Dyer MD Apr 4, 2018 10:33

## 2018-04-04 NOTE — RADRPT
EXAM DATE/TIME:  04/04/2018 08:57 

 

[facility] COMPARISON:     

No previous studies available for comparison.

 

                     

INDICATIONS :     

Orif right elbow.

                     

 

MEDICAL HISTORY :     

None.          

 

SURGICAL HISTORY :     

None.   

 

ENCOUNTER:     

Subsequent                                        

 

ACUITY:     

2 days      

 

PAIN SCORE:     

Non-responsive.

 

LOCATION:     

Right  Elbow.

 

FINDINGS:     

4 images are recorded digitally the operating room using C-arm during placement of an ulnar olecranon
 plate.

 

CONCLUSION:     

Intraoperative images.  A

 

 

 

 

 Timmy Stringer MD on April 04, 2018 at 9:52           

Board Certified Radiologist.

 This report was verified electronically.

## 2018-04-04 NOTE — PD.ORT.PN
Subjective


Subjective Remarks


s/p fall in parking lot


right elbow pain. reports right knee pain but minimal.





Objective


Vitals





Vital Signs








  Date Time  Temp Pulse Resp B/P (MAP) Pulse Ox O2 Delivery O2 Flow Rate FiO2


 


4/4/18 00:10 97.7 71 16 101/53 (69) 95   


 


4/3/18 20:05 98.3 84 17 114/57 (76) 97   


 


4/3/18 17:14        


 


4/3/18 17:04  80 16 112/57 (75) 96 Room Air  


 


4/3/18 14:23 98.2 74 18 114/68 (83) 96   








Result Diagram:  


4/3/18 1600                                                                    

            4/3/18 1600





Other Results





Laboratory Tests








Test


  4/3/18


16:00


 


Prothromb Time International


Ratio 1.2 RATIO 


 


 


Prothrombin Time


  12.5 SEC


(9.8-11.6)








Imaging





Last 24 hours Impressions








Chest X-Ray 4/3/18 1541 Signed





Impressions: 





 Service Date/Time:  Tuesday, April 3, 2018 16:33 - CONCLUSION:  No evidence of 





 acute cardiopulmonary disease.     Oscar Briggs MD 








Objective Remarks


RUE: +long arm splint. NVI with good motion of fingers and wrist.


RLE: full motion of knee. bruising over patella. nontender.





Assessment & Plan


Assessment and Plan


1) Right Olecranon Fx


   -npo


   -consents


   -surgery today with Juan


   -possibly home later today if doing well post op











Manohar Yuen/First Assist PA Apr 4, 2018 06:40

## 2018-04-04 NOTE — EKG
Date Performed: 04/03/2018       Time Performed: 16:58:11

 

PTAGE:      67 years

 

EKG:      Sinus rhythm 

 

 NORMAL ECG

 

PREVIOUS TRACING       : 05/27/2014 11.26

 

DOCTOR:   Joe Frausto  Interpretating Date/Time  04/04/2018 08:06:24